# Patient Record
Sex: MALE | Race: WHITE | URBAN - METROPOLITAN AREA
[De-identification: names, ages, dates, MRNs, and addresses within clinical notes are randomized per-mention and may not be internally consistent; named-entity substitution may affect disease eponyms.]

---

## 2019-02-10 ENCOUNTER — HOSPITAL ENCOUNTER (EMERGENCY)
Facility: CLINIC | Age: 34
Discharge: HOME | End: 2019-02-10
Attending: EMERGENCY MEDICINE | Admitting: EMERGENCY MEDICINE
Payer: OTHER MISCELLANEOUS

## 2019-02-10 VITALS
SYSTOLIC BLOOD PRESSURE: 126 MMHG | DIASTOLIC BLOOD PRESSURE: 88 MMHG | BODY MASS INDEX: 37.77 KG/M2 | HEART RATE: 74 BPM | OXYGEN SATURATION: 99 % | RESPIRATION RATE: 16 BRPM | HEIGHT: 66 IN | WEIGHT: 235 LBS | TEMPERATURE: 97.9 F

## 2019-02-10 DIAGNOSIS — S61.209A FINGERTIP AVULSION, INITIAL ENCOUNTER: ICD-10-CM

## 2019-02-10 PROCEDURE — 90715 TDAP VACCINE 7 YRS/> IM: CPT | Performed by: EMERGENCY MEDICINE

## 2019-02-10 PROCEDURE — 25000128 H RX IP 250 OP 636: Performed by: EMERGENCY MEDICINE

## 2019-02-10 PROCEDURE — 99283 EMERGENCY DEPT VISIT LOW MDM: CPT | Mod: 25

## 2019-02-10 PROCEDURE — 90471 IMMUNIZATION ADMIN: CPT

## 2019-02-10 PROCEDURE — 12001 RPR S/N/AX/GEN/TRNK 2.5CM/<: CPT

## 2019-02-10 RX ADMIN — CLOSTRIDIUM TETANI TOXOID ANTIGEN (FORMALDEHYDE INACTIVATED), CORYNEBACTERIUM DIPHTHERIAE TOXOID ANTIGEN (FORMALDEHYDE INACTIVATED), BORDETELLA PERTUSSIS TOXOID ANTIGEN (GLUTARALDEHYDE INACTIVATED), BORDETELLA PERTUSSIS FILAMENTOUS HEMAGGLUTININ ANTIGEN (FORMALDEHYDE INACTIVATED), BORDETELLA PERTUSSIS PERTACTIN ANTIGEN, AND BORDETELLA PERTUSSIS FIMBRIAE 2/3 ANTIGEN 0.5 ML: 5; 2; 2.5; 5; 3; 5 INJECTION, SUSPENSION INTRAMUSCULAR at 13:54

## 2019-02-10 ASSESSMENT — MIFFLIN-ST. JEOR: SCORE: 1953.7

## 2019-02-10 ASSESSMENT — ENCOUNTER SYMPTOMS: WOUND: 1

## 2019-02-10 NOTE — LETTER
February 10, 2019      To Whom It May Concern:      Dm Zuñiga was seen in our Emergency Department today, 02/10/19.  I expect his condition to improve over the next 1-2 days.  He may return to work when improved, however he may require accommodations as he will be unable to place pressure on the right index finger until his wound heals.    Sincerely,        Prabhjot Siegel MD

## 2019-02-10 NOTE — ED AVS SNAPSHOT
Emergency Department  [address] [place] [id number]  Phone:  [phone]  Fax:  [phone]                                    Dm Zuñiga   MRN: [id number]    Department:   Emergency Department   Date of Visit:  2/10/2019           After Visit Summary Signature Page    I have received my discharge instructions, and my questions have been answered. I have discussed any challenges I see with this plan with the nurse or doctor.    ..........................................................................................................................................  Patient/Patient Representative Signature      ..........................................................................................................................................  Patient Representative Print Name and Relationship to Patient    ..................................................               ................................................  Date                                   Time    ..........................................................................................................................................  Reviewed by Signature/Title    ...................................................              ..............................................  Date                                               Time          22EPIC Rev 08/18

## 2019-02-10 NOTE — ED PROVIDER NOTES
"  History     Chief Complaint:  Laceration    HPI   Dm Zuñiga is a right-handed 33 year old male who presents to the emergency department today for evaluation of a finger laceration. This happened around 1200 today. This happened when slicing onions on a mandolin; he sliced the tip, palmar aspect of his right index finger.  No other injuries. Patient cannot recall his last tetanus. He denies any difficulty with numbness or weakness in the finger/hand. Bleeding controlled prior to arrival. No other concerns or complaints.     Allergies:  No Known Drug Allergies    Medications:    Medications reviewed. No pertinent medications.    Past Medical History:    History reviewed. No pertinent medical history.    Past Surgical History:    History reviewed. No pertinent surgical history.     Family History:    History reviewed. No pertinent family history.    Review of Systems   Skin: Positive for wound.   All other systems reviewed and are negative.    Physical Exam     Patient Vitals for the past 24 hrs:   BP Temp Pulse Resp SpO2 Height Weight   02/10/19 1230 126/88 97.9  F (36.6  C) 74 16 99 % 1.676 m (5' 6\") 106.6 kg (235 lb)     Physical Exam  General: Well appearing, nontoxic. Resting comfortably  Head:  Scalp, face, and head appear normal  Eyes:  Pupils equal, round    Conjunctivae noninjected and sclera white  ENT:    The nose is normal    Ears/pinnae are normal  Neck:  Normal range of motion  CV:  RRR, no M/R/G  Resp:  CTAB, no increased WOB  MSK:  1cm circular soft tissue avulsion injury to the tip/pad of the right index finger. Bone is not exposed. There is mild venous oozing from the injury bed. Flexion/extension normal at the DIP, PIP, and MCP. No other injuries to the hand, fingers, wrist, or forearm. Radial pulses 2+ bilaterally. SILT in all fingertips. No nail injury.  Skin:  No rash or lesions noted. Wound as noted above.  Neuro: Speech is normal and fluent    Moves all extremities " spontaneously  Psych:  Awake, Alert. Normal affect      Appropriate interactions           Emergency Department Course     Procedures:  [facility] Procedure Note        Laceration Repair:    Performed by: Prabhjot Siegel MD   Authorized by: Prabhjot Siegel MD   Consent given by: Patient who states understanding of the procedure being performed after discussing the risks, benefits and alternatives.    Preparation: Patient was prepped and draped in usual sterile fashion.  Irrigation solution: saline    Body area: right index finger  Laceration length: 1 cm round soft tissue avulsion  Contamination: The wound is not contaminated.  Foreign bodies:none  Tendon involvement: none  Anesthesia: Digital block  Local anesthetic: Bupivacaine 0.5%  Anesthetic total: 3ml    Debridement: none  Skin closure: Finger tournicot placed on finger to achieve hemostasis then the wound was covered with wound adhesive and allowed to dry. The tournicot was then removed and there was no ongoing bleeding.  Approximation difficulty: simple     Patient tolerance: Patient tolerated the procedure well with no immediate complications.     Interventions:  1354 Tdap 0.5 mL IM    Emergency Department Course:    1235 Nursing notes and vitals reviewed.    1440 I performed an exam of the patient as documented above.     1310 Laceration repaired. See note above.     1415 I personally reviewed the procedure results with the patient and answered all related questions prior to discharge.    Impression & Plan      Medical Decision Making:  Dm Zuñiga is a 33 year old male presented to the Emergency Department with a .  Given soft tissue avulsion injury of the finger tip. Due to loss of tissue the wound edges cannot be approximated.  After adequate anesthesia was obtained, the wound was thoroughly irrigated and examined.  There is no evidence of muscular, tendon, or bony involvement at this time, nor signs or symptoms of neurovascular compromise.   Wound was repaired as outlined above in the procedure note. No evidence of bony trauma. Fingertip splint applied to protect the wound. Recommended keeping the wound dry and following up with PCP in 3-5 days for recheck. Tetanus updated. Return precautions were discussed with patient. The patient's questions were answered and the patient was agreeable with discharge.      Diagnosis:    ICD-10-CM    1. Fingertip avulsion, initial encounter S61.209A      Disposition:   Discharge    Scribe Disclosure:  Sincere SUAZO, am serving as a scribe at 12:32 PM on 2/10/2019 to document services personally performed by Prabhjot Siegel MD based on my observations and the provider's statements to me.      EMERGENCY DEPARTMENT       Prabhjot Siegel MD  02/11/19 8497